# Patient Record
Sex: FEMALE
[De-identification: names, ages, dates, MRNs, and addresses within clinical notes are randomized per-mention and may not be internally consistent; named-entity substitution may affect disease eponyms.]

---

## 2017-04-18 ENCOUNTER — RX ONLY (OUTPATIENT)
Age: 22
Setting detail: RX ONLY
End: 2017-04-18

## 2020-01-22 ENCOUNTER — APPOINTMENT (OUTPATIENT)
Age: 25
Setting detail: DERMATOLOGY
End: 2020-01-23

## 2020-01-22 DIAGNOSIS — Z41.9 ENCOUNTER FOR PROCEDURE FOR PURPOSES OTHER THAN REMEDYING HEALTH STATE, UNSPECIFIED: ICD-10-CM

## 2020-01-22 PROCEDURE — OTHER OTHER: OTHER

## 2020-01-22 ASSESSMENT — LOCATION ZONE DERM: LOCATION ZONE: SCALP

## 2020-01-22 ASSESSMENT — LOCATION SIMPLE DESCRIPTION DERM: LOCATION SIMPLE: POSTERIOR SCALP

## 2020-01-22 ASSESSMENT — LOCATION DETAILED DESCRIPTION DERM
LOCATION DETAILED: MID-OCCIPITAL SCALP
LOCATION DETAILED: POSTERIOR MID-PARIETAL SCALP

## 2020-01-22 NOTE — PROCEDURE: OTHER
Other (Free Text): Patient is here due to slow hair growth and interested in trying PRP for Hair. We discussed what PRP us typically used for and after examining patients scalp, she does not have any loss. Patient is frustrated that her hair will not grow past a certain point. I advised she speak to her primary care about possible thyroid test or rule out any other health concerns as I am not sure PRP will help with her concern.\\nIf she decided to go ahead with treatment it will be with no guarantees that the results she is looking for will occur. \\nFollow up PRN. bbw
Detail Level: Detailed
Note Text (......Xxx Chief Complaint.): This diagnosis correlates with the